# Patient Record
Sex: MALE | Race: WHITE
[De-identification: names, ages, dates, MRNs, and addresses within clinical notes are randomized per-mention and may not be internally consistent; named-entity substitution may affect disease eponyms.]

---

## 2018-10-02 ENCOUNTER — HOSPITAL ENCOUNTER (OUTPATIENT)
Dept: HOSPITAL 11 - JP.SDS | Age: 38
Discharge: HOME | End: 2018-10-02
Attending: SURGERY
Payer: COMMERCIAL

## 2018-10-02 DIAGNOSIS — K29.50: Primary | ICD-10-CM

## 2018-10-02 DIAGNOSIS — K44.9: ICD-10-CM

## 2018-10-02 PROCEDURE — 43239 EGD BIOPSY SINGLE/MULTIPLE: CPT

## 2018-10-03 NOTE — OR
DATE OF PROCEDURE:  10/02/2018

 

PROCEDURES:

1. Esophagogastroduodenoscopy.

2. Bravo pH monitor placement.

 

COMPLICATIONS:  None.

 

ASSISTANT:  None.

 

FINDINGS:

1. Mild inflammation of the distal antrum, but not extending into the duodenum.

2. Hiatal hernia, moderate.

3. Bravo pH placement.

 

RISKS:  Risks, benefits, alternatives, and limitations including, but not limited to

infection, bleeding, and perforation explained to the patient, who wished to proceed.

 

PREOPERATIVE DIAGNOSIS:  Epigastric pain.

 

POSTOPERATIVE DIAGNOSIS:  Epigastric pain.

 

PROCEDURE IN DETAIL:  The patient was placed in left lateral decubitus position.  The EGD

scope was introduced and advanced atraumatically into the second part of duodenum.  No

duodenal ulcers.  Duodenum did not show any abnormalities.  In the gastric antrum, there was

mild inflammation consistent with gastritis.  This was biopsied using cold biopsy forceps.

On retroflex, the patient was noted to have a hiatal hernia.

 

Within the stomach itself, there was no evidence of ulceration and no abnormality.

 

GE junction showed inflammation consistent with reflux disease.

 

The GE junction was then measured.  The remainder of the esophagus was normal.

 

The Bravo pH Monitor was then introduced as the scope was removed.  This was placed 6 cm

from the GE junction.  This was performed in a standard technique by applying suction,

waiting 30 seconds, deploying the device, releasing the suction, and removing the carrier.

 

The scope was then reintroduced to inspect the area and the device was found to be clipped

and packed without abnormality.  The patient tolerated the procedure well.

 

 

 

Ace Jerez MD

DD:  10/02/2018 08:11:26

DT:  10/02/2018 15:04:39

Job #:  [id number]

## 2018-10-04 ENCOUNTER — HOSPITAL ENCOUNTER (OUTPATIENT)
Dept: HOSPITAL 11 - JP.ED | Age: 38
Setting detail: OBSERVATION
LOS: 2 days | Discharge: HOME | End: 2018-10-06
Attending: INTERNAL MEDICINE | Admitting: INTERNAL MEDICINE
Payer: COMMERCIAL

## 2018-10-04 DIAGNOSIS — K21.9: ICD-10-CM

## 2018-10-04 DIAGNOSIS — H81.10: Primary | ICD-10-CM

## 2018-10-04 DIAGNOSIS — Z79.82: ICD-10-CM

## 2018-10-04 DIAGNOSIS — Z79.899: ICD-10-CM

## 2018-10-04 PROCEDURE — 96361 HYDRATE IV INFUSION ADD-ON: CPT

## 2018-10-04 PROCEDURE — 80053 COMPREHEN METABOLIC PANEL: CPT

## 2018-10-04 PROCEDURE — 81001 URINALYSIS AUTO W/SCOPE: CPT

## 2018-10-04 PROCEDURE — 85025 COMPLETE CBC W/AUTO DIFF WBC: CPT

## 2018-10-04 PROCEDURE — 99285 EMERGENCY DEPT VISIT HI MDM: CPT

## 2018-10-04 PROCEDURE — C9113 INJ PANTOPRAZOLE SODIUM, VIA: HCPCS

## 2018-10-04 PROCEDURE — 43247 EGD REMOVE FOREIGN BODY: CPT

## 2018-10-04 PROCEDURE — 97165 OT EVAL LOW COMPLEX 30 MIN: CPT

## 2018-10-04 PROCEDURE — 80305 DRUG TEST PRSMV DIR OPT OBS: CPT

## 2018-10-04 PROCEDURE — 70450 CT HEAD/BRAIN W/O DYE: CPT

## 2018-10-04 PROCEDURE — 70460 CT HEAD/BRAIN W/DYE: CPT

## 2018-10-04 PROCEDURE — 83735 ASSAY OF MAGNESIUM: CPT

## 2018-10-04 PROCEDURE — G0378 HOSPITAL OBSERVATION PER HR: HCPCS

## 2018-10-04 PROCEDURE — 36415 COLL VENOUS BLD VENIPUNCTURE: CPT

## 2018-10-04 PROCEDURE — 86140 C-REACTIVE PROTEIN: CPT

## 2018-10-04 PROCEDURE — 96374 THER/PROPH/DIAG INJ IV PUSH: CPT

## 2018-10-04 PROCEDURE — 97530 THERAPEUTIC ACTIVITIES: CPT

## 2018-10-04 PROCEDURE — 71045 X-RAY EXAM CHEST 1 VIEW: CPT

## 2018-10-04 NOTE — EDM.PDOC
ED HPI GENERAL MEDICAL PROBLEM





- General


Chief Complaint: General


Stated Complaint: CAME FROM CLINIC


Time Seen by Provider: 10/04/18 16:56


Source of Information: Reports: Patient, Family


History Limitations: Reports: No Limitations





- History of Present Illness


INITIAL COMMENTS - FREE TEXT/NARRATIVE: 





pt arrived with marked vertigo and feeling like he is going to fall. At times 

he doesn,t seem to coomprhend what is being said to him. He has had headaches 

but does not have a severe headache at this ttime. 


Onset: Other ( started last nite. )


Duration: Hour(s):


Location: Reports: Head, Generalized


Associated Symptoms: Reports: Confusion, Headaches, Other (nausea but no 

vomiting. )





- Related Data


 Allergies











Allergy/AdvReac Type Severity Reaction Status Date / Time


 


No Known Allergies Allergy   Verified 10/02/18 07:05











Home Meds: 


 Home Meds





Fluticasone Propionate [Flonase] 2 spray NS DAILY 09/28/18 [History]


Niacin 250 mg PO DAILY 09/28/18 [History]


Aspirin [Halfprin] 81 mg PO DAILY 10/02/18 [History]











Past Medical History


Gastrointestinal History: Reports: Hemorrhoids, Other (See Below)


Other Gastrointestinal History: bravo procedure on 10/2/18


Musculoskeletal History: Reports: Other (See Below)


Other Musculoskeletal History: factial fx





- Infectious Disease History


Infectious Disease History: Reports: Chicken Pox





- Past Surgical History


HEENT Surgical History: Reports: Adenoidectomy, Tonsillectomy


GI Surgical History: Reports: Appendectomy, Colonoscopy





Social & Family History





- Family History


Family Medical History: Noncontributory





- Tobacco Use


Smoking Status *Q: Never Smoker





- Caffeine Use


Caffeine Use: Reports: Soda





- Recreational Drug Use


Recreational Drug Use: No





ED ROS GENERAL





- Review of Systems


Review Of Systems: See Below


Constitutional: Reports: Other (nausea)


HEENT: Reports: Vertigo


Respiratory: Reports: No Symptoms


Cardiovascular: Reports: No Symptoms


Endocrine: Reports: No Symptoms


GI/Abdominal: Reports: Nausea


: Reports: No Symptoms


Musculoskeletal: Reports: No Symptoms


Skin: Reports: No Symptoms


Neurological: Reports: Confusion, Other (vertigo)


Psychiatric: Reports: No Symptoms





ED EXAM, GENERAL





- Physical Exam


Exam: See Below


Free Text/Narrative:: 





pt arrived with a history of vertigo starting last nit. He is feeling very 

vague at times and not comprehending directions. He does not have a headache. 


Exam Limited By: No Limitations


General Appearance: Alert, Moderate Distress, Other (pupils are equal and 

reactive. )


Ears: Normal TMs, Other (no nuchal rigidity. )


Throat/Mouth: Normal Inspection


Head: Atraumatic


Neck: Normal Inspection


Respiratory/Chest: No Respiratory Distress


Cardiovascular: Regular Rate, Rhythm


GI/Abdominal: Soft, Non-Tender


 (Male) Exam: Deferred


Rectal (Males) Exam: Deferred


Back Exam: Normal Inspection


Extremities: Normal Inspection


Neurological: Alert, Oriented, Normal Cognition, Other (pt is somewhat vague 

looking at times. )





Course





- Vital Signs


Last Recorded V/S: 


 Last Vital Signs











Temp  35.4 C   10/04/18 16:17


 


Pulse  74   10/04/18 16:17


 


Resp  16   10/04/18 16:17


 


BP  132/85   10/04/18 16:17


 


Pulse Ox  96   10/04/18 16:17














- Orders/Labs/Meds


Orders: 


 Active Orders 24 hr











 Category Date Time Status


 


 Orthostatic Vital Signs [RC] ASDIRECTED Care  10/04/18 16:46 Active


 


 Chest 1V Frontal [CR] Stat Exams  10/04/18 17:44 Ordered


 


 Head wo Cont [CT] Stat Exams  10/04/18 16:58 Taken


 


 Sodium Chloride 0.9% [Normal Saline] 1,000 ml Med  10/04/18 18:00 Active





 IV ASDIRECTED   








 Medication Orders





Sodium Chloride (Normal Saline)  1,000 mls @ 300 mls/hr IV ASDIRECTED AZAM


   Last Admin: 10/04/18 18:01  Dose: 300 mls/hr








Labs: 


 Laboratory Tests











  10/04/18 10/04/18 10/04/18 Range/Units





  16:16 16:16 16:16 


 


WBC  7.7    (4.5-11.0)  K/uL


 


RBC  4.61    (4.30-5.90)  M/uL


 


Hgb  14.3    (12.0-15.0)  g/dL


 


Hct  40.5    (40.0-54.0)  %


 


MCV  88    (80-98)  fL


 


MCH  31    (27-31)  pg


 


MCHC  35    (32-36)  %


 


Plt Count  270    (150-400)  K/uL


 


Neut % (Auto)  44    (36-66)  %


 


Lymph % (Auto)  42    (24-44)  %


 


Mono % (Auto)  7 H    (2-6)  %


 


Eos % (Auto)  6 H    (2-4)  %


 


Baso % (Auto)  1    (0-1)  %


 


Sodium   140   (140-148)  mmol/L


 


Potassium   3.8   (3.6-5.2)  mmol/L


 


Chloride   103   (100-108)  mmol/L


 


Carbon Dioxide   28   (21-32)  mmol/L


 


Anion Gap   12.8   (5.0-14.0)  mmol/L


 


BUN   17   (7-18)  mg/dL


 


Creatinine   1.4 H   (0.8-1.3)  mg/dL


 


Est Cr Clr Drug Dosing   76.94   mL/min


 


Estimated GFR (MDRD)   57 L   (>60)  


 


Glucose   128 H   ()  mg/dL


 


Calcium   7.8 L   (8.5-10.1)  mg/dL


 


Total Bilirubin   0.4   (0.2-1.0)  mg/dL


 


AST   18   (15-37)  U/L


 


ALT   33   (12-78)  U/L


 


Alkaline Phosphatase   61   ()  U/L


 


C-Reactive Protein    0.10  (0.0-0.3)  mg/dL


 


Total Protein   7.3   (6.4-8.2)  g/dL


 


Albumin   3.7   (3.4-5.0)  g/dL


 


Globulin   3.6 H   (2.3-3.5)  g/dL


 


Albumin/Globulin Ratio   1.0 L   (1.2-2.2)  


 


Urine Color     


 


Urine Appearance     


 


Urine pH     (4.5-8.0)  


 


Ur Specific Gravity     (1.008-1.030)  


 


Urine Protein     (NEGATIVE)  mg/dL


 


Urine Glucose (UA)     (NEGATIVE)  mg/dL


 


Urine Ketones     (NEGATIVE)  mg/dL


 


Urine Occult Blood     (NEGATIVE)  


 


Urine Nitrite     (NEGAITVE)  


 


Urine Bilirubin     (NEGATIVE)  


 


Urine Urobilinogen     (NORMAL)  mg/dL


 


Ur Leukocyte Esterase     (NEGATIVE)  


 


Urine RBC     (0-5)  


 


Urine WBC     (0-5)  


 


Ur Epithelial Cells     


 


Amorphous Sediment     


 


Urine Bacteria     


 


Urine Mucus     














  10/04/18 Range/Units





  17:14 


 


WBC   (4.5-11.0)  K/uL


 


RBC   (4.30-5.90)  M/uL


 


Hgb   (12.0-15.0)  g/dL


 


Hct   (40.0-54.0)  %


 


MCV   (80-98)  fL


 


MCH   (27-31)  pg


 


MCHC   (32-36)  %


 


Plt Count   (150-400)  K/uL


 


Neut % (Auto)   (36-66)  %


 


Lymph % (Auto)   (24-44)  %


 


Mono % (Auto)   (2-6)  %


 


Eos % (Auto)   (2-4)  %


 


Baso % (Auto)   (0-1)  %


 


Sodium   (140-148)  mmol/L


 


Potassium   (3.6-5.2)  mmol/L


 


Chloride   (100-108)  mmol/L


 


Carbon Dioxide   (21-32)  mmol/L


 


Anion Gap   (5.0-14.0)  mmol/L


 


BUN   (7-18)  mg/dL


 


Creatinine   (0.8-1.3)  mg/dL


 


Est Cr Clr Drug Dosing   mL/min


 


Estimated GFR (MDRD)   (>60)  


 


Glucose   ()  mg/dL


 


Calcium   (8.5-10.1)  mg/dL


 


Total Bilirubin   (0.2-1.0)  mg/dL


 


AST   (15-37)  U/L


 


ALT   (12-78)  U/L


 


Alkaline Phosphatase   ()  U/L


 


C-Reactive Protein   (0.0-0.3)  mg/dL


 


Total Protein   (6.4-8.2)  g/dL


 


Albumin   (3.4-5.0)  g/dL


 


Globulin   (2.3-3.5)  g/dL


 


Albumin/Globulin Ratio   (1.2-2.2)  


 


Urine Color  Yellow  


 


Urine Appearance  Clear  


 


Urine pH  7.0  (4.5-8.0)  


 


Ur Specific Gravity  1.015  (1.008-1.030)  


 


Urine Protein  Negative  (NEGATIVE)  mg/dL


 


Urine Glucose (UA)  Normal  (NEGATIVE)  mg/dL


 


Urine Ketones  Negative  (NEGATIVE)  mg/dL


 


Urine Occult Blood  Negative  (NEGATIVE)  


 


Urine Nitrite  Negative  (NEGAITVE)  


 


Urine Bilirubin  Negative  (NEGATIVE)  


 


Urine Urobilinogen  Normal  (NORMAL)  mg/dL


 


Ur Leukocyte Esterase  Negative  (NEGATIVE)  


 


Urine RBC  Not seen  (0-5)  


 


Urine WBC  Not seen  (0-5)  


 


Ur Epithelial Cells  Not seen  


 


Amorphous Sediment  Not seen  


 


Urine Bacteria  Rare  


 


Urine Mucus  Not seen  











Meds: 


Medications











Generic Name Dose Route Start Last Admin





  Trade Name Freq  PRN Reason Stop Dose Admin


 


Sodium Chloride  1,000 mls @ 300 mls/hr  10/04/18 18:00  10/04/18 18:01





  Normal Saline  IV   300 mls/hr





  ASDIRECTED AZAM   Administration





     





     





     





     














Discontinued Medications














Generic Name Dose Route Start Last Admin





  Trade Name Martín  PRN Reason Stop Dose Admin


 


Meclizine HCl  25 mg  10/04/18 17:46  10/04/18 18:01





  Antivert  PO  10/04/18 17:47  25 mg





  ONETIME ONE   Administration





     





     





     





     


 


Ondansetron HCl  4 mg  10/04/18 17:45  10/04/18 18:04





  Zofran  IVPUSH  10/04/18 17:46  4 mg





  ONETIME ONE   Administration





     





     





     





     














- Re-Assessments/Exams


Free Text/Narrative Re-Assessment/Exam: 





10/04/18 18:16


lab work was found to be normal. His cat scan of the head was normal. 





Departure





- Departure


Time of Disposition: 18:17


Disposition: Admitted As Inpatient 67


Condition: Fair


Clinical Impression: 


 Vertigo, Confusion








- Discharge Information


Referrals: 


Fitz Garcia, NP [Primary Care Provider] - 


Forms:  ED Department Discharge


Care Plan Goals: 


admit to [facility]





- My Orders


Last 24 Hours: 


My Active Orders





10/04/18 16:46


Orthostatic Vital Signs [RC] ASDIRECTED 





10/04/18 16:58


Head wo Cont [CT] Stat 





10/04/18 17:44


Chest 1V Frontal [CR] Stat 





10/04/18 18:00


Sodium Chloride 0.9% [Normal Saline] 1,000 ml IV ASDIRECTED 














- Assessment/Plan


Last 24 Hours: 


My Active Orders





10/04/18 16:46


Orthostatic Vital Signs [RC] ASDIRECTED 





10/04/18 16:58


Head wo Cont [CT] Stat 





10/04/18 17:44


Chest 1V Frontal [CR] Stat 





10/04/18 18:00


Sodium Chloride 0.9% [Normal Saline] 1,000 ml IV ASDIRECTED

## 2018-10-04 NOTE — PCM.HP
H&P History of Present Illness





- General


Date of Service: 10/04/18


Admit Problem/Dx: 


 Admission Diagnosis/Problem





Admission Diagnosis/Problem      Vertigo








Source of Information: Patient, Family (Wife)


History Limitations: Reports: No Limitations





- History of Present Illness


Initial Comments - Free Text/Narative: 


 


Fitz reports he had a Bravo procedure for epigastric and GERD problems,  

surgery under general anesthesia on Tuesday, he was discharged to home. 

Wednesday he had a normal day but in the evening became mildly lightheaded 

which progressed into Thursday. 





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 He became acutely dizzy, almost fell 

off ladder. His Boss brought him to his Wife for evaluation. His wife is a 

nurse midwife who was at her office at [facility]. His wife noted  Fitz  

was confused, not knowing who she was or names children. She brought Fitz to 

emergency room for further evaluation.











Onset of Symptoms: Reports: Sudden


Duration of Symptoms: Reports: Day(s):


Location: Reports: Generalized


Severity: Severe


Improves with: Reports: None


Worsens with: Reports: None


Associated Symptoms: Reports: Confusion, Headaches (Frontal), Nausea/Vomiting, 

Syncope (Near syncope)





- Related Data


Allergies/Adverse Reactions: 


 Allergies











Allergy/AdvReac Type Severity Reaction Status Date / Time


 


No Known Allergies Allergy   Verified 10/02/18 07:05











Home Medications: 


 Home Meds





Fluticasone Propionate [Flonase] 2 spray NS DAILY 09/28/18 [History]


Niacin 250 mg PO DAILY 09/28/18 [History]


Aspirin [Halfprin] 81 mg PO DAILY 10/02/18 [History]











Past Medical History


Gastrointestinal History: Reports: Hemorrhoids, Other (See Below)


Other Gastrointestinal History: bravo procedure on 10/2/18


Musculoskeletal History: Reports: Other (See Below)


Other Musculoskeletal History: factial fx





- Infectious Disease History


Infectious Disease History: Reports: Chicken Pox





- Past Surgical History


HEENT Surgical History: Reports: Adenoidectomy, Tonsillectomy


GI Surgical History: Reports: Appendectomy, Colonoscopy





Social & Family History





- Family History


Family Medical History: Noncontributory





- Tobacco Use


Smoking Status *Q: Never Smoker





- Caffeine Use


Caffeine Use: Reports: Soda





- Recreational Drug Use


Recreational Drug Use: No





- Living Situation & Occupation


Living situation: Reports: 


Occupation: Employed (With wife Maria Guadalupe and 3 children. employed as Airplane 

restorer.)





H&P Review of Systems





- Review of Systems:


Review Of Systems: See Below (All)


General: Reports: Fatigue


HEENT: Reports: Headaches


Pulmonary: Reports: No Symptoms


Cardiovascular: Reports: No Symptoms


Gastrointestinal: Reports: Nausea


Genitourinary: Reports: No Symptoms


Musculoskeletal: Reports: No Symptoms


Skin: Reports: No Symptoms


Psychiatric: Reports: No Symptoms


Neurological: Reports: Confusion, Dizziness, Headache, Syncope (near), 

Difficulty Walking


Hematologic/Lymphatic: Reports: No Symptoms


Immunologic: Reports: Seasonal Allergy





Exam





- Exam


Exam: See Below





- Vital Signs


Vital Signs: 


 Last Vital Signs











Temp  35.4 C   10/04/18 16:17


 


Pulse  74   10/04/18 16:17


 


Resp  16   10/04/18 16:17


 


BP  132/85   10/04/18 16:17


 


Pulse Ox  96   10/04/18 16:17








 





Orthostatic Blood Pressure [     128/95


Standing]                        


Orthostatic Blood Pressure [     146/97


Sitting]                         


Orthostatic Blood Pressure [     131/88


Supine]                          








Weight: 90.718 kg





- Exam


General: Alert, Oriented, Cooperative


HEENT: PERRLA, Hearing Intact, Mucosa Moist & Pink, Nares Patent, Normal Nasal 

Septum, Posterior Pharynx Clear, Conjunctiva Clear, EOMI, EACs Clear, TMs Clear


Neck: Supple, Trachea Midline, 2


Lungs: Clear to Auscultation, Normal Respiratory Effort


Cardiovascular: Regular Rate, Regular Rhythm, Normal S1


GI/Abdominal Exam: Normal Bowel Sounds, Soft, Non-Tender, No Organomegaly, No 

Distention, No Abnormal Bruit, No Mass, Pelvis Stable


 (Male) Exam: Deferred


Rectal (Males) Exam: Deferred


Back Exam: Normal Inspection, Full Range of Motion


Extremities: Normal Inspection, Normal Range of Motion, Non-Tender, No Pedal 

Edema, Normal Capillary Refill


Peripheral Pulses: 2+: Radial (L), Radial (R)


Skin: Warm, Dry, Intact


Neurological: Cranial Nerves Intact, Reflexes Equal Bilateral, Strength Equal 

Bilateral, Normal Speech, Normal Tone, Sensation Intact


Neuro Extensive - Mental Status: Alert, Oriented x3, Normal Mood/Affect, Normal 

Cognition, Memory Intact


Psychiatric: Alert, Normal Affect, Normal Mood





- Patient Data


Lab Results Last 24 hrs: 


 Laboratory Results - last 24 hr











  10/04/18 10/04/18 10/04/18 Range/Units





  16:16 16:16 16:16 


 


WBC  7.7    (4.5-11.0)  K/uL


 


RBC  4.61    (4.30-5.90)  M/uL


 


Hgb  14.3    (12.0-15.0)  g/dL


 


Hct  40.5    (40.0-54.0)  %


 


MCV  88    (80-98)  fL


 


MCH  31    (27-31)  pg


 


MCHC  35    (32-36)  %


 


Plt Count  270    (150-400)  K/uL


 


Neut % (Auto)  44    (36-66)  %


 


Lymph % (Auto)  42    (24-44)  %


 


Mono % (Auto)  7 H    (2-6)  %


 


Eos % (Auto)  6 H    (2-4)  %


 


Baso % (Auto)  1    (0-1)  %


 


Sodium   140   (140-148)  mmol/L


 


Potassium   3.8   (3.6-5.2)  mmol/L


 


Chloride   103   (100-108)  mmol/L


 


Carbon Dioxide   28   (21-32)  mmol/L


 


Anion Gap   12.8   (5.0-14.0)  mmol/L


 


BUN   17   (7-18)  mg/dL


 


Creatinine   1.4 H   (0.8-1.3)  mg/dL


 


Est Cr Clr Drug Dosing   76.94   mL/min


 


Estimated GFR (MDRD)   57 L   (>60)  


 


Glucose   128 H   ()  mg/dL


 


Calcium   7.8 L   (8.5-10.1)  mg/dL


 


Total Bilirubin   0.4   (0.2-1.0)  mg/dL


 


AST   18   (15-37)  U/L


 


ALT   33   (12-78)  U/L


 


Alkaline Phosphatase   61   ()  U/L


 


C-Reactive Protein    0.10  (0.0-0.3)  mg/dL


 


Total Protein   7.3   (6.4-8.2)  g/dL


 


Albumin   3.7   (3.4-5.0)  g/dL


 


Globulin   3.6 H   (2.3-3.5)  g/dL


 


Albumin/Globulin Ratio   1.0 L   (1.2-2.2)  


 


Urine Color     


 


Urine Appearance     


 


Urine pH     (4.5-8.0)  


 


Ur Specific Gravity     (1.008-1.030)  


 


Urine Protein     (NEGATIVE)  mg/dL


 


Urine Glucose (UA)     (NEGATIVE)  mg/dL


 


Urine Ketones     (NEGATIVE)  mg/dL


 


Urine Occult Blood     (NEGATIVE)  


 


Urine Nitrite     (NEGAITVE)  


 


Urine Bilirubin     (NEGATIVE)  


 


Urine Urobilinogen     (NORMAL)  mg/dL


 


Ur Leukocyte Esterase     (NEGATIVE)  


 


Urine RBC     (0-5)  


 


Urine WBC     (0-5)  


 


Ur Epithelial Cells     


 


Amorphous Sediment     


 


Urine Bacteria     


 


Urine Mucus     


 


Urine Opiates Screen     (NEGATIVE)  


 


Ur Oxycodone Screen     (NEGATIVE)  


 


Urine Methadone Screen     (NEGATIVE)  


 


Ur Propoxyphene Screen     (NEGATIVE)  


 


Ur Barbiturates Screen     (NEGATIVE)  


 


Ur Tricyclics Screen     (NEGATIVE)  


 


Ur Phencyclidine Scrn     (NEGATIVE)  


 


Ur Amphetamine Screen     (NEGATIVE)  


 


U Methamphetamines Scrn     (NEGATIVE)  


 


Urine MDMA Screen     (NEGATIVE)  


 


U Benzodiazepines Scrn     (NEGATIVE)  


 


U Cocaine Metab Screen     (NEGATIVE)  


 


U Marijuana (THC) Screen     (NEGATIVE)  














  10/04/18 10/04/18 Range/Units





  17:14 18:33 


 


WBC    (4.5-11.0)  K/uL


 


RBC    (4.30-5.90)  M/uL


 


Hgb    (12.0-15.0)  g/dL


 


Hct    (40.0-54.0)  %


 


MCV    (80-98)  fL


 


MCH    (27-31)  pg


 


MCHC    (32-36)  %


 


Plt Count    (150-400)  K/uL


 


Neut % (Auto)    (36-66)  %


 


Lymph % (Auto)    (24-44)  %


 


Mono % (Auto)    (2-6)  %


 


Eos % (Auto)    (2-4)  %


 


Baso % (Auto)    (0-1)  %


 


Sodium    (140-148)  mmol/L


 


Potassium    (3.6-5.2)  mmol/L


 


Chloride    (100-108)  mmol/L


 


Carbon Dioxide    (21-32)  mmol/L


 


Anion Gap    (5.0-14.0)  mmol/L


 


BUN    (7-18)  mg/dL


 


Creatinine    (0.8-1.3)  mg/dL


 


Est Cr Clr Drug Dosing    mL/min


 


Estimated GFR (MDRD)    (>60)  


 


Glucose    ()  mg/dL


 


Calcium    (8.5-10.1)  mg/dL


 


Total Bilirubin    (0.2-1.0)  mg/dL


 


AST    (15-37)  U/L


 


ALT    (12-78)  U/L


 


Alkaline Phosphatase    ()  U/L


 


C-Reactive Protein    (0.0-0.3)  mg/dL


 


Total Protein    (6.4-8.2)  g/dL


 


Albumin    (3.4-5.0)  g/dL


 


Globulin    (2.3-3.5)  g/dL


 


Albumin/Globulin Ratio    (1.2-2.2)  


 


Urine Color  Yellow   


 


Urine Appearance  Clear   


 


Urine pH  7.0   (4.5-8.0)  


 


Ur Specific Gravity  1.015   (1.008-1.030)  


 


Urine Protein  Negative   (NEGATIVE)  mg/dL


 


Urine Glucose (UA)  Normal   (NEGATIVE)  mg/dL


 


Urine Ketones  Negative   (NEGATIVE)  mg/dL


 


Urine Occult Blood  Negative   (NEGATIVE)  


 


Urine Nitrite  Negative   (NEGAITVE)  


 


Urine Bilirubin  Negative   (NEGATIVE)  


 


Urine Urobilinogen  Normal   (NORMAL)  mg/dL


 


Ur Leukocyte Esterase  Negative   (NEGATIVE)  


 


Urine RBC  Not seen   (0-5)  


 


Urine WBC  Not seen   (0-5)  


 


Ur Epithelial Cells  Not seen   


 


Amorphous Sediment  Not seen   


 


Urine Bacteria  Rare   


 


Urine Mucus  Not seen   


 


Urine Opiates Screen   Negative  (NEGATIVE)  


 


Ur Oxycodone Screen   Negative  (NEGATIVE)  


 


Urine Methadone Screen   Negative  (NEGATIVE)  


 


Ur Propoxyphene Screen   Negative  (NEGATIVE)  


 


Ur Barbiturates Screen   Negative  (NEGATIVE)  


 


Ur Tricyclics Screen   Negative  (NEGATIVE)  


 


Ur Phencyclidine Scrn   Negative  (NEGATIVE)  


 


Ur Amphetamine Screen   Negative  (NEGATIVE)  


 


U Methamphetamines Scrn   Negative  (NEGATIVE)  


 


Urine MDMA Screen   Negative  (NEGATIVE)  


 


U Benzodiazepines Scrn   Negative  (NEGATIVE)  


 


U Cocaine Metab Screen   Negative  (NEGATIVE)  


 


U Marijuana (THC) Screen   Negative  (NEGATIVE)  











Result Diagrams: 


 10/04/18 16:16





 10/04/18 16:16





- Problem List


(1) Confusion


SNOMED Code(s): 731690150


   ICD Code: R41.0 - DISORIENTATION, UNSPECIFIED   Status: Acute   Priority: 

High   Current Visit: Yes   





(2) Vertigo


SNOMED Code(s): 972029043


   ICD Code: R42 - DIZZINESS AND GIDDINESS   Status: Acute   Priority: High   

Current Visit: Yes   


Problem List Initiated/Reviewed/Updated: Yes


Orders Last 24hrs: 


 Active Orders 24 hr











 Category Date Time Status


 


 Patient Status Manage Transfer [TRANSFER] Routine ADT  10/04/18 19:32 Ordered


 


 Orthostatic Vital Signs [RC] ASDIRECTED Care  10/04/18 16:46 Active


 


 Chest 1V Frontal [CR] Stat Exams  10/04/18 17:44 Taken


 


 Head wo Cont [CT] Stat Exams  10/04/18 16:58 Taken


 


 Sodium Chloride 0.9% [Normal Saline] 1,000 ml Med  10/04/18 18:00 Active





 IV ASDIRECTED   


 


 Resuscitation Status Routine Resus Stat  10/04/18 19:32 Ordered








 Medication Orders





Sodium Chloride (Normal Saline)  1,000 mls @ 300 mls/hr IV ASDIRECTED [facility]


   Last Admin: 10/04/18 18:01  Dose: 300 mls/hr








Assessment/Plan Comment:: 





Assessment and plan


 


Fitz reports he had a Bravo procedure for epigastric and GERD problems,  

surgery under general anesthesia on Tuesday, he was discharged to home. 

Wednesday he had a normal day but in the evening became mildly lightheaded 

which progressed into Thursday. 





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 He became acutely dizzy, almost fell 

off ladder. His Boss brought him to his Wife for evaluation. His wife is a 

nurse midwife who was at her office at [facility]. His wife noted  Fitz  

was confused, not knowing who she was or names children. She brought Fitz to 

emergency room for further evaluation.





Emergency room Head CT without contrast which was negative for any acute process

, hemoglobin 14 3 hematocrit 40.5 platelets 270 WBC normal, chemistries sodium 

140, potassium 3.8 chloride 103, 12.8, 17, creatinine 1.4, glucose, calcium 7.8

, urine normal, urine drug screen negative. Given IV fluids and anti-emetics 

which improved his symptoms. He is feeling much better.





Due to the onset of neurological symptoms patient will admitted observation for 

further monitoring.





Vertigo


-Admit to [address] [place] [address] for further monitoring


-IV Fluids for rehydration LR at 125 mL per hour


-Anti-emetics        


-Neuro checks every 2 hours while awake          


-Advise to notify nurses of any chest pain or other symptoms


-Repeat head CT with contrast 6am


-And a.m. labs: CBC, CMP





Maintenance issues


-Orders home meds: ordered


-Nutrition: regular diet


-Dudley catheter not indicated at this time


-DVT: Contraindicated 


-PPI: IV Protonix 40mg daily





CODE STATUS: FULL CODE





Admission status: Admit to Observation


-I expect this patient to stay less than 24 hours, not to exceed 96 hours for 

evaluation and management of this problem.





Disposition: home with family





Primary care provider:Fitz Garcia NP





Hospitalist: Dr. Duncan

## 2018-10-05 RX ADMIN — FLUTICASONE PROPIONATE SCH: 50 SPRAY, METERED NASAL at 08:02

## 2018-10-05 NOTE — PCM.PN
- General Info


Date of Service: 10/05/18


Subjective Update: 





Mr. Galloway is a 38-year-old gentleman who was admitted through the emergency 

department last night with severe vertigo, nausea vomiting, and confusion. He 

had an outpatient procedure done here 2 days prior to admission, he felt well 

the first day but on the day of admission developed symptoms of severe vertigo 

which seemed to be positional in nature associated with nausea vomiting as well 

as confusion and memory impairment. He was admitted and has received IV fluids, 

symptoms were significantly improved in the morning and have almost totally 

resolved now this afternoon. CT scan without contrast was obtained on admission 

and was unremarkable followed by CT scan with contrast this morning also felt 

to be unremarkable. He's never had similar symptoms in the past.


Functional Status: Reports: Tolerating Diet, Ambulating, Urinating





- Review of Systems


General: Denies: Fever, Weakness, Chills


Pulmonary: Reports: No Symptoms


Cardiovascular: Reports: No Symptoms


Gastrointestinal: Reports: No Symptoms


Neurological: Reports: Confusion, Dizziness, Other (Vertigo).  Denies: Headache

, Numbness, Paresthesia, Pre-Existing Deficit, Seizure, Tremors





- Patient Data


Vitals - Most Recent: 


 Last Vital Signs











Temp  96.6 F   10/05/18 13:48


 


Pulse  100   10/05/18 13:48


 


Resp  16   10/05/18 13:48


 


BP  134/85   10/05/18 13:48


 


Pulse Ox  97   10/05/18 13:48








 





Orthostatic Blood Pressure [     128/95


Standing]                        


Orthostatic Blood Pressure [     146/97


Sitting]                         


Orthostatic Blood Pressure [     131/88


Supine]                          








Weight - Most Recent: 196 lb 6.416 oz


I&O - Last 24 Hours: 


 Intake & Output











 10/05/18 10/05/18 10/05/18





 06:59 14:59 22:59


 


Intake Total 1350 1580 


 


Output Total 600  


 


Balance 750 1580 











Lab Results Last 24 Hours: 


 Laboratory Results - last 24 hr











  10/04/18 10/04/18 10/05/18 Range/Units





  17:14 18:33 05:11 


 


WBC    6.0  (4.5-11.0)  K/uL


 


RBC    4.49  (4.30-5.90)  M/uL


 


Hgb    13.8  (12.0-15.0)  g/dL


 


Hct    39.5 L  (40.0-54.0)  %


 


MCV    88  (80-98)  fL


 


MCH    31  (27-31)  pg


 


MCHC    35  (32-36)  %


 


Plt Count    252  (150-400)  K/uL


 


Neut % (Auto)    42  (36-66)  %


 


Lymph % (Auto)    43  (24-44)  %


 


Mono % (Auto)    8 H  (2-6)  %


 


Eos % (Auto)    7 H  (2-4)  %


 


Baso % (Auto)    1  (0-1)  %


 


Sodium     (140-148)  mmol/L


 


Potassium     (3.6-5.2)  mmol/L


 


Chloride     (100-108)  mmol/L


 


Carbon Dioxide     (21-32)  mmol/L


 


Anion Gap     (5.0-14.0)  mmol/L


 


BUN     (7-18)  mg/dL


 


Creatinine     (0.8-1.3)  mg/dL


 


Est Cr Clr Drug Dosing     mL/min


 


Estimated GFR (MDRD)     (>60)  


 


Glucose     ()  mg/dL


 


Calcium     (8.5-10.1)  mg/dL


 


Magnesium     (1.8-2.4)  mg/dL


 


Total Bilirubin     (0.2-1.0)  mg/dL


 


AST     (15-37)  U/L


 


ALT     (12-78)  U/L


 


Alkaline Phosphatase     ()  U/L


 


Total Protein     (6.4-8.2)  g/dL


 


Albumin     (3.4-5.0)  g/dL


 


Globulin     (2.3-3.5)  g/dL


 


Albumin/Globulin Ratio     (1.2-2.2)  


 


Urine Color  Yellow    


 


Urine Appearance  Clear    


 


Urine pH  7.0    (4.5-8.0)  


 


Ur Specific Gravity  1.015    (1.008-1.030)  


 


Urine Protein  Negative    (NEGATIVE)  mg/dL


 


Urine Glucose (UA)  Normal    (NEGATIVE)  mg/dL


 


Urine Ketones  Negative    (NEGATIVE)  mg/dL


 


Urine Occult Blood  Negative    (NEGATIVE)  


 


Urine Nitrite  Negative    (NEGAITVE)  


 


Urine Bilirubin  Negative    (NEGATIVE)  


 


Urine Urobilinogen  Normal    (NORMAL)  mg/dL


 


Ur Leukocyte Esterase  Negative    (NEGATIVE)  


 


Urine RBC  Not seen    (0-5)  


 


Urine WBC  Not seen    (0-5)  


 


Ur Epithelial Cells  Not seen    


 


Amorphous Sediment  Not seen    


 


Urine Bacteria  Rare    


 


Urine Mucus  Not seen    


 


Urine Opiates Screen   Negative   (NEGATIVE)  


 


Ur Oxycodone Screen   Negative   (NEGATIVE)  


 


Urine Methadone Screen   Negative   (NEGATIVE)  


 


Ur Propoxyphene Screen   Negative   (NEGATIVE)  


 


Ur Barbiturates Screen   Negative   (NEGATIVE)  


 


Ur Tricyclics Screen   Negative   (NEGATIVE)  


 


Ur Phencyclidine Scrn   Negative   (NEGATIVE)  


 


Ur Amphetamine Screen   Negative   (NEGATIVE)  


 


U Methamphetamines Scrn   Negative   (NEGATIVE)  


 


Urine MDMA Screen   Negative   (NEGATIVE)  


 


U Benzodiazepines Scrn   Negative   (NEGATIVE)  


 


U Cocaine Metab Screen   Negative   (NEGATIVE)  


 


U Marijuana (THC) Screen   Negative   (NEGATIVE)  














  10/05/18 Range/Units





  05:20 


 


WBC   (4.5-11.0)  K/uL


 


RBC   (4.30-5.90)  M/uL


 


Hgb   (12.0-15.0)  g/dL


 


Hct   (40.0-54.0)  %


 


MCV   (80-98)  fL


 


MCH   (27-31)  pg


 


MCHC   (32-36)  %


 


Plt Count   (150-400)  K/uL


 


Neut % (Auto)   (36-66)  %


 


Lymph % (Auto)   (24-44)  %


 


Mono % (Auto)   (2-6)  %


 


Eos % (Auto)   (2-4)  %


 


Baso % (Auto)   (0-1)  %


 


Sodium  142  (140-148)  mmol/L


 


Potassium  4.0  (3.6-5.2)  mmol/L


 


Chloride  107  (100-108)  mmol/L


 


Carbon Dioxide  26  (21-32)  mmol/L


 


Anion Gap  9.1  (5.0-14.0)  mmol/L


 


BUN  12  (7-18)  mg/dL


 


Creatinine  1.1  (0.8-1.3)  mg/dL


 


Est Cr Clr Drug Dosing  97.93  mL/min


 


Estimated GFR (MDRD)  > 60  (>60)  


 


Glucose  114 H  ()  mg/dL


 


Calcium  7.6 L  (8.5-10.1)  mg/dL


 


Magnesium  1.8  (1.8-2.4)  mg/dL


 


Total Bilirubin  0.4  (0.2-1.0)  mg/dL


 


AST  16  (15-37)  U/L


 


ALT  29  (12-78)  U/L


 


Alkaline Phosphatase  52  ()  U/L


 


Total Protein  6.5  (6.4-8.2)  g/dL


 


Albumin  3.3 L  (3.4-5.0)  g/dL


 


Globulin  3.2  (2.3-3.5)  g/dL


 


Albumin/Globulin Ratio  1.0 L  (1.2-2.2)  


 


Urine Color   


 


Urine Appearance   


 


Urine pH   (4.5-8.0)  


 


Ur Specific Gravity   (1.008-1.030)  


 


Urine Protein   (NEGATIVE)  mg/dL


 


Urine Glucose (UA)   (NEGATIVE)  mg/dL


 


Urine Ketones   (NEGATIVE)  mg/dL


 


Urine Occult Blood   (NEGATIVE)  


 


Urine Nitrite   (NEGAITVE)  


 


Urine Bilirubin   (NEGATIVE)  


 


Urine Urobilinogen   (NORMAL)  mg/dL


 


Ur Leukocyte Esterase   (NEGATIVE)  


 


Urine RBC   (0-5)  


 


Urine WBC   (0-5)  


 


Ur Epithelial Cells   


 


Amorphous Sediment   


 


Urine Bacteria   


 


Urine Mucus   


 


Urine Opiates Screen   (NEGATIVE)  


 


Ur Oxycodone Screen   (NEGATIVE)  


 


Urine Methadone Screen   (NEGATIVE)  


 


Ur Propoxyphene Screen   (NEGATIVE)  


 


Ur Barbiturates Screen   (NEGATIVE)  


 


Ur Tricyclics Screen   (NEGATIVE)  


 


Ur Phencyclidine Scrn   (NEGATIVE)  


 


Ur Amphetamine Screen   (NEGATIVE)  


 


U Methamphetamines Scrn   (NEGATIVE)  


 


Urine MDMA Screen   (NEGATIVE)  


 


U Benzodiazepines Scrn   (NEGATIVE)  


 


U Cocaine Metab Screen   (NEGATIVE)  


 


U Marijuana (THC) Screen   (NEGATIVE)  











Med Orders - Current: 


 Current Medications





Acetaminophen (Tylenol)  650 mg PO Q4H PRN


   PRN Reason: Pain (Mild 1-3)/fever


Albuterol (Proventil Neb Soln)  2.5 mg NEB Q4H PRN


   PRN Reason: Shortness Of Breath/wheezing


Aspirin (Halfprin)  81 mg PO DAILY [facility]


   Last Admin: 10/05/18 08:59 Dose:  81 mg


Docusate Sodium (Colace)  100 mg PO BID PRN


   PRN Reason: Constipation


Fluticasone Propionate (Flonase)  0 gm ELIZABETH DAILY [facility]


   Last Admin: 10/05/18 08:02 Dose:  Not Given


Ondansetron HCl (Zofran Odt)  4 mg PO Q6H PRN


   PRN Reason: Nausea able to take PO


Oxycodone HCl (Oxycodone)  5 mg PO Q4H PRN


   PRN Reason: Pain (moderate 4-6)


Pantoprazole Sodium (Protonix***)  40 mg PO BIDAC [facility]


   Last Admin: 10/05/18 08:59 Dose:  40 mg


Temazepam (Restoril)  15 mg PO BEDTIME PRN


   PRN Reason: Sleep





Discontinued Medications





Sodium Chloride (Normal Saline)  1,000 mls @ 300 mls/hr IV ASDIRECTED [facility]


   Last Admin: 10/04/18 18:01 Dose:  300 mls/hr


Lactated Ringer's (Ringers, Lactated)  1,000 mls @ 125 mls/hr IV ASDIRECTED [facility]


   Last Admin: 10/05/18 05:21 Dose:  125 mls/hr


Iopamidol (Isovue-300 (61%))  100 ml IV .AS DIRECTED PRN


   PRN Reason: RADIOLOGY EXAM


   Stop: 10/06/18 07:11


   Last Admin: 10/05/18 07:29 Dose:  100 ml


Lorazepam (Ativan)  1 mg IV Q6H PRN


   PRN Reason: Nausea/Vomiting


Meclizine HCl (Antivert)  25 mg PO ONETIME ONE


   Stop: 10/04/18 17:47


   Last Admin: 10/04/18 18:01 Dose:  25 mg


Morphine Sulfate (Morphine)  2 mg IVPUSH Q2H PRN


   PRN Reason: Pain (severe 7-10)


Ondansetron HCl (Zofran)  4 mg IVPUSH ONETIME ONE


   Stop: 10/04/18 17:46


   Last Admin: 10/04/18 18:04 Dose:  4 mg


Pantoprazole Sodium (Protonix Iv***)  40 mg IVPUSH Q12H [facility]


   Last Admin: 10/04/18 21:09 Dose:  40 mg


Pantoprazole Sodium (Protonix Iv***)  40 mg IVPUSH Q12H AZAM











- Exam


General: Alert, Oriented, Cooperative, No Acute Distress


HEENT: Pupils Equal, Pupils Reactive, Other (Hallpike maneuvers were found to 

be positive on the right)


Lungs: Clear to Auscultation, Normal Respiratory Effort


Cardiovascular: Regular Rate, Regular Rhythm, No Murmurs


GI/Abdominal Exam: Soft, Non-Tender, No Organomegaly, No Distention


Neurological: No New Focal Deficit





- Problem List Review


Problem List Initiated/Reviewed/Updated: Yes





- My Orders


Last 24 Hours: 


My Active Orders





10/05/18 11:48


Consult to Occupational Therapy [OT Evaluation and Treatment] [CONS] Routine 





10/05/18 16:57


Discontinue Telemetry Monitoring [Cardiac Monitoring Discontinue] [RC] Click to 

Edit 





10/05/18 16:58


Convert IV to Saline Lock [OM.PC] Routine 





10/06/18 05:00


Chest 1V Frontal [CR] Timed 





10/06/18 Breakfast


NPO After Midnight [Nothing per Oral After Midnight Diet] [DIET] 














- Plan


Plan:: 





Assessment and plan





Benign positional Vertigo-associated with symptoms of confusion, nausea and 

vomiting, and significant confusion with memory impairment.. Significantly 

improved this morning and symptoms have almost totally resolved this afternoon. 

CT scan obtained with and without contrast shows no significant abnormalities.


-Saline lock IV


-Anti-emetics        


-Neuro checks every 2 hours while awake          





Maintenance issues


-Orders home meds: ordered


-Nutrition: regular diet


-Dudley catheter not indicated at this time


-DVT: Contraindicated 


-PPI: IV Protonix 40mg daily





CODE STATUS: FULL CODE





Admission status: Admit to Observation


-I expect this patient to stay less than 24 hours, not to exceed 96 hours for 

evaluation and management of this problem.





Disposition: home with family





Primary care provider:Fitz Garcia NP





Hospitalist: Dr. Duncan

## 2018-10-05 NOTE — CR
CHEST: Portable

 

CLINICAL HISTORY:GERD

 

COMPARISON:None

 

FINDINGS:  Lung fields are clear. Heart and pulmonary vascularity are normal.. Small radiopacity in t
he mid mediastinum is likely an esophageal monitor device.

 

Impression: No acute cardio pelvic process

## 2018-10-06 RX ADMIN — FLUTICASONE PROPIONATE SCH: 50 SPRAY, METERED NASAL at 08:47

## 2018-10-06 NOTE — PCM.DCSUM1
**Discharge Summary





- Hospital Course


Brief History: Mr. Galloway is a 38-year-old gentleman who is admitted through 

the emergency department for further evaluation and management of weakness, 

confusion, and vertigo.





- Discharge Data


Discharge Date: 10/06/18


Discharge Disposition: Home, Self-Care 01


Condition: Good





- Discharge Diagnosis/Problem(s)


(1) BPV (benign positional vertigo)


SNOMED Code(s): 807093391


   ICD Code: H81.10 - BENIGN PAROXYSMAL VERTIGO, UNSPECIFIED EAR   Status: 

Acute   Current Visit: Yes   





(2) Confusion


SNOMED Code(s): 350286093


   ICD Code: R41.0 - DISORIENTATION, UNSPECIFIED   Status: Acute   Priority: 

High   Current Visit: Yes   





- Patient Summary/Data


Consults: 


 Consultations





10/05/18 11:48


Consult to Occupational Therapy [OT Evaluation and Treatment] [CONS] Routine 


   Please Evaluate and Treat.


   OT Reason for Consult: Vertigo


   This query below is only for informational purposes and is not editable.


   Admission Diagnosis/Problem: Vertigo











Hospital Course: 





Mr. Galloway is a 38-year-old gentleman who is admitted through the emergency 

department with relatively abrupt onset of confusion, weakness, and vertigo. 

[place] reports he had a Bravo procedure for epigastric and GERD problems on 

Tuesday, October 2,  he was discharged to home. Wednesday he had a normal day 

but in the evening became mildly lightheaded which progressed into Thursday. On 

the day of admission reports that he did not feel quite well in the morning but 

did go to work. While he is at work noted progressive onset of more severe 

symptoms of vertigo especially with change in head position. Symptoms became 

very severe to the point where he had difficulty standing or walking. Also 

during this time began to experience some confusion and memory impairment. He 

was initially brought to the clinic for further evaluation but then transferred 

to the emergency department. In the emergency department was noted to be 

confused and forgetful with symptoms of significant vertigo and nausea. CT scan 

of the head was obtained without contrast and showed no acute abnormalities. 

Laboratory studies were obtained and showed no acute or significant 

abnormalities.





He was admitted to the hospital and given IV fluids for hydration. By the next 

day had had good improvement in his confusion, but not totally resolved and 

almost total resolution of his vertigo. Hallpike maneuvers were performed and 

found to be positive on the right. He was seen and evaluated by occupational 

therapy because of vertigo. Later in the day his symptoms had totally resolved 

with no further confusion or vertigo. He was monitored overnight to assure 

total resolution of symptoms. In the next morning was feeling well. Situation 

was reviewed with neurology, they did recommend follow-up MRI early part of 

next week to make sure there are no obvious neurologic abnormalities not seen 

on CT scan of the head. He was seen by Dr. Jerez prior to discharge EGD 

was performed for removal of the probable device from the esophagus. This will 

allow us to proceed with the MRI early next week. Activity will be as tolerated 

and he will resume his usual diet. Follow-up will be scheduled with his primary 

care provider midweek.











- Patient Instructions


Diet: Usual Diet as Tolerated


Activity: As Tolerated


Other/Special Instructions: MRI of the brain with and with out contrast for 

further evaluation of vertigo and confusion, on Tuesday, October 9, 2018. 

Please schedule follow-up appointment with primary care provider within one 

week.





- Discharge Plan


*PRESCRIPTION DRUG MONITORING PROGRAM REVIEWED*: Not Applicable


*COPY OF PRESCRIPTION DRUG MONITORING REPORT IN PATIENT [facility]: Not Applicable


Home Medications: 


 Home Meds





Fluticasone Propionate [Flonase] 2 spray NS DAILY 09/28/18 [History]


Niacin 250 mg PO DAILY 09/28/18 [History]


Aspirin [Halfprin] 81 mg PO DAILY 10/02/18 [History]








Referrals: 


Fitz Garcia NP [Primary Care Provider] - 10/10/18 2:20 pm





- Discharge Summary/Plan Comment


DC Time >30 min.: No





- Patient Data


Vitals - Most Recent: 


 Last Vital Signs











Temp  96.4 F   10/06/18 07:54


 


Pulse  81   10/06/18 07:54


 


Resp  16   10/06/18 07:54


 


BP  110/89   10/06/18 07:54


 


Pulse Ox  97   10/06/18 07:54








 





Orthostatic Blood Pressure [     128/95


Standing]                        


Orthostatic Blood Pressure [     146/97


Sitting]                         


Orthostatic Blood Pressure [     131/88


Supine]                          








Weight - Most Recent: 196 lb 6.416 oz


Med Orders - Current: 


 Current Medications





Acetaminophen (Tylenol)  650 mg PO Q4H PRN


   PRN Reason: Pain (Mild 1-3)/fever


   Last Admin: 10/05/18 21:38 Dose:  650 mg


Albuterol (Proventil Neb Soln)  2.5 mg NEB Q4H PRN


   PRN Reason: Shortness Of Breath/wheezing


Aspirin (Halfprin)  81 mg PO DAILY [facility]


   Last Admin: 10/06/18 08:35 Dose:  81 mg


Docusate Sodium (Colace)  100 mg PO BID PRN


   PRN Reason: Constipation


   Last Admin: 10/06/18 08:35 Dose:  100 mg


Fluticasone Propionate (Flonase)  0 gm ELIZABETH DAILY [facility]


   Last Admin: 10/06/18 08:47 Dose:  Not Given


Ondansetron HCl (Zofran Odt)  4 mg PO Q6H PRN


   PRN Reason: Nausea able to take PO


Oxycodone HCl (Oxycodone)  5 mg PO Q4H PRN


   PRN Reason: Pain (moderate 4-6)


Pantoprazole Sodium (Protonix***)  40 mg PO BIDAC [facility]


   Last Admin: 10/06/18 07:52 Dose:  40 mg


Temazepam (Restoril)  15 mg PO BEDTIME PRN


   PRN Reason: Sleep


   Last Admin: 10/05/18 21:38 Dose:  15 mg





Discontinued Medications





Fentanyl (Sublimaze) Confirm Administered Dose 100 mcg .ROUTE .STK-MED ONE


   Stop: 10/06/18 11:49


Sodium Chloride (Normal Saline)  1,000 mls @ 300 mls/hr IV ASDIRECTED [facility]


   Last Admin: 10/04/18 18:01 Dose:  300 mls/hr


Lactated Ringer's (Ringers, Lactated)  1,000 mls @ 125 mls/hr IV ASDIRECTED [facility]


   Last Admin: 10/05/18 05:21 Dose:  125 mls/hr


Iopamidol (Isovue-300 (61%))  100 ml IV .AS DIRECTED PRN


   PRN Reason: RADIOLOGY EXAM


   Stop: 10/06/18 07:11


   Last Admin: 10/05/18 07:29 Dose:  100 ml


Lorazepam (Ativan)  1 mg IV Q6H PRN


   PRN Reason: Nausea/Vomiting


Meclizine HCl (Antivert)  25 mg PO ONETIME ONE


   Stop: 10/04/18 17:47


   Last Admin: 10/04/18 18:01 Dose:  25 mg


Morphine Sulfate (Morphine)  2 mg IVPUSH Q2H PRN


   PRN Reason: Pain (severe 7-10)


Ondansetron HCl (Zofran)  4 mg IVPUSH ONETIME ONE


   Stop: 10/04/18 17:46


   Last Admin: 10/04/18 18:04 Dose:  4 mg


Pantoprazole Sodium (Protonix Iv***)  40 mg IVPUSH Q12H [facility]


   Last Admin: 10/04/18 21:09 Dose:  40 mg


Pantoprazole Sodium (Protonix Iv***)  40 mg IVPUSH Q12H AZAM


Propofol (Diprivan  20 Ml) Confirm Administered Dose 200 mg .ROUTE .STK-MED ONE


   Stop: 10/06/18 11:49











- Exam


General: Reports: Alert, Oriented, Cooperative, No Acute Distress


Lungs: Reports: Clear to Auscultation, Normal Respiratory Effort


Cardiovascular: Reports: Regular Rate, Regular Rhythm, No Murmurs


GI/Abdominal Exam: Soft, Non-Tender, No Organomegaly, No Distention

## 2018-10-08 NOTE — CR
CHEST: AP upright

 

CLINICAL HISTORY:PH monitor position

 

COMPARISON:10/4/2018

 

FINDINGS:  The heart size, pulmonary vascular and hilar structures are normal. No infiltrate effusion
 or pneumothorax is seen. There is an esophageal monitor in the mid mediastinum

 

IMPRESSION: No acute cardiopulmonary process.

 

Esophageal monitor as above

## 2018-10-08 NOTE — OR
DATE OF PROCEDURE:  10/06/2018

 

PROCEDURE:

1. EGD.

2. Removal of Bravo pH wireless monitor.

 

COMPLICATIONS:  None.

 

ASSISTANT:  None.

 

ANESTHESIA:  MAC.

 

INDICATIONS:  This is a pleasant 38-year-old male who several days ago had a Bravo wireless

pH monitor placed.  This functioned very well, and the patient did very well.

Unfortunately, the patient was hospitalized due to unrelated circumstances.  Part of this

hospitalization evaluation will require the patient to have an MRI.  Therefore, the pH

monitor will be required to be removed.  We discussed risks, benefits, alternatives,

limitations of this plan including the rarity of this event, my experience with this, the

risks including infection, bleeding, esophageal perforation, septic shock, fistula

formation, and other risks not listed here.  The patient understands these risks and wished

to proceed.

 

PROCEDURE IN DETAIL:  The patient was placed in the left lateral decubitus position.  The

EGD scope was introduced and advanced.  The tip was identified at 6 cm from the GE junction,

still clipped into place.  Endoscopic scissors were attempted to cut the tissue.

Unfortunately, this was unable to be removed this way.  Therefore, a loop was able to be

passed around it and gentle electrocautery was used to separate the tissue from the device.

 

This device was then grabbed, placed into the stomach.  This was then grabbed with a net and

then pulled back antegrade without any difficulty.

 

The EGD scope was then introduced.  A very minimal thermal injury was noted.  No specific

bleeding or abnormality noted at the removal site.

 

Of note, there was no gastritis or any abnormality in the EGD portion of this exam.

 

 

 

 

Ace Jerez MD

DD:  10/06/2018 15:30:03

DT:  10/06/2018 16:46:32

Job #:  [id number]